# Patient Record
Sex: MALE | Race: OTHER | NOT HISPANIC OR LATINO | URBAN - METROPOLITAN AREA
[De-identification: names, ages, dates, MRNs, and addresses within clinical notes are randomized per-mention and may not be internally consistent; named-entity substitution may affect disease eponyms.]

---

## 2023-02-10 NOTE — ASU PATIENT PROFILE, ADULT - NS PREOP UNDERSTANDS INFO
yes No solid food/dairy/candy/gum after midnight, water before 10:00am tomorrow morning; Patient to bring photo ID/insurance card; no jewelries/contact lens/valuables; dress in comfortable clothes; no smoking/alcohol drinking/drug use today; escort to have photo ID; address and call back number was given./yes

## 2023-02-10 NOTE — ASU PATIENT PROFILE, ADULT - NSICDXPASTSURGICALHX_GEN_ALL_CORE_FT
PAST SURGICAL HISTORY:  H/O ingrown nail toe    History of deviated nasal septum     History of ear surgery right ear    Left varicocele

## 2023-02-14 ENCOUNTER — OUTPATIENT (OUTPATIENT)
Dept: OUTPATIENT SERVICES | Facility: HOSPITAL | Age: 43
LOS: 1 days | Discharge: HOME | End: 2023-02-14
Payer: COMMERCIAL

## 2023-02-14 VITALS
SYSTOLIC BLOOD PRESSURE: 123 MMHG | RESPIRATION RATE: 19 BRPM | HEART RATE: 81 BPM | OXYGEN SATURATION: 99 % | DIASTOLIC BLOOD PRESSURE: 79 MMHG | TEMPERATURE: 98 F

## 2023-02-14 VITALS
TEMPERATURE: 98 F | OXYGEN SATURATION: 96 % | WEIGHT: 155.87 LBS | DIASTOLIC BLOOD PRESSURE: 70 MMHG | RESPIRATION RATE: 16 BRPM | SYSTOLIC BLOOD PRESSURE: 116 MMHG | HEIGHT: 70 IN | HEART RATE: 69 BPM

## 2023-02-14 DIAGNOSIS — Z98.890 OTHER SPECIFIED POSTPROCEDURAL STATES: Chronic | ICD-10-CM

## 2023-02-14 DIAGNOSIS — Z87.09 PERSONAL HISTORY OF OTHER DISEASES OF THE RESPIRATORY SYSTEM: Chronic | ICD-10-CM

## 2023-02-14 DIAGNOSIS — I86.1 SCROTAL VARICES: Chronic | ICD-10-CM

## 2023-02-14 DIAGNOSIS — Z87.2 PERSONAL HISTORY OF DISEASES OF THE SKIN AND SUBCUTANEOUS TISSUE: Chronic | ICD-10-CM

## 2023-02-14 PROCEDURE — 38221 DX BONE MARROW BIOPSIES: CPT | Mod: AS

## 2023-02-14 RX ORDER — FENTANYL CITRATE 50 UG/ML
25 INJECTION INTRAVENOUS
Refills: 0 | Status: DISCONTINUED | OUTPATIENT
Start: 2023-02-14 | End: 2023-02-14

## 2023-02-14 RX ORDER — HYDROMORPHONE HYDROCHLORIDE 2 MG/ML
0.5 INJECTION INTRAMUSCULAR; INTRAVENOUS; SUBCUTANEOUS
Refills: 0 | Status: DISCONTINUED | OUTPATIENT
Start: 2023-02-14 | End: 2023-02-14

## 2023-02-14 RX ORDER — CHLORHEXIDINE GLUCONATE 213 G/1000ML
1 SOLUTION TOPICAL DAILY
Refills: 0 | Status: DISCONTINUED | OUTPATIENT
Start: 2023-02-14 | End: 2023-02-14

## 2023-02-14 RX ORDER — ACETAMINOPHEN 500 MG
1000 TABLET ORAL ONCE
Refills: 0 | Status: COMPLETED | OUTPATIENT
Start: 2023-02-14 | End: 2023-02-14

## 2023-02-14 RX ORDER — ONDANSETRON 8 MG/1
4 TABLET, FILM COATED ORAL ONCE
Refills: 0 | Status: DISCONTINUED | OUTPATIENT
Start: 2023-02-14 | End: 2023-02-14

## 2023-02-14 RX ORDER — VANCOMYCIN HCL 1 G
1000 VIAL (EA) INTRAVENOUS ONCE
Refills: 0 | Status: COMPLETED | OUTPATIENT
Start: 2023-02-14 | End: 2023-02-14

## 2023-02-14 RX ORDER — APREPITANT 80 MG/1
40 CAPSULE ORAL ONCE
Refills: 0 | Status: COMPLETED | OUTPATIENT
Start: 2023-02-14 | End: 2023-02-14

## 2023-02-14 RX ORDER — SODIUM CHLORIDE 9 MG/ML
1000 INJECTION, SOLUTION INTRAVENOUS
Refills: 0 | Status: DISCONTINUED | OUTPATIENT
Start: 2023-02-14 | End: 2023-02-14

## 2023-02-14 RX ORDER — OXYCODONE HYDROCHLORIDE 5 MG/1
5 TABLET ORAL ONCE
Refills: 0 | Status: DISCONTINUED | OUTPATIENT
Start: 2023-02-14 | End: 2023-02-14

## 2023-02-14 RX ADMIN — Medication 250 MILLIGRAM(S): at 14:44

## 2023-02-14 RX ADMIN — CHLORHEXIDINE GLUCONATE 1 APPLICATION(S): 213 SOLUTION TOPICAL at 14:30

## 2023-02-14 RX ADMIN — APREPITANT 40 MILLIGRAM(S): 80 CAPSULE ORAL at 14:44

## 2023-02-14 NOTE — ASU DISCHARGE PLAN (ADULT/PEDIATRIC) - CARE PROVIDER_API CALL
Andrea Romero)  Orthopaedic Surgery; Sports Medicine; Surgery of the Hand  [address] [place] [address]  Phone: [phone]  Fax: [phone]  Established Patient  Follow Up Time:

## 2023-02-14 NOTE — PRE-ANESTHESIA EVALUATION ADULT - NSANTHOSAYNRD_GEN_A_CORE
No. YOSEPH screening performed.  STOP BANG Legend: 0-2 = LOW Risk; 3-4 = INTERMEDIATE Risk; 5-8 = HIGH Risk

## 2023-02-14 NOTE — ASU DISCHARGE PLAN (ADULT/PEDIATRIC) - PROCEDURE
Right Knee Arthroscopy, Partial Medial Menisectomy, Synovectomy, Bone Marrow Aspirate Concentrate injection (BMAC)

## 2023-02-14 NOTE — ASU DISCHARGE PLAN (ADULT/PEDIATRIC) - ASU DC SPECIAL INSTRUCTIONSFT
Keep leg elevated and with ice.   Keep dressing dry.  Non-Weight Bearing Right Leg with Crutches.  Take medication as prescribed.  Follow up next week with Dr. Romero.

## 2023-02-14 NOTE — BRIEF OPERATIVE NOTE - NSICDXBRIEFPROCEDURE_GEN_ALL_CORE_FT
PROCEDURES:  Synovectomy, knee, major, arthroscopic 14-Feb-2023 18:49:24  Lexx [facility]den  Injection, aspirate, bone marrow, concentrated 14-Feb-2023 18:50:01  Lexx Bowden  Arthroscopic partial medial meniscectomy 14-Feb-2023 18:50:53  Lexx Bowden

## 2023-02-14 NOTE — ASU PREOP CHECKLIST - HEIGHT IN CM
177.8 Peng Advancement Flap Text: The defect edges were debeveled with a #15 scalpel blade.  Given the location of the defect, shape of the defect and the proximity to free margins a Peng advancement flap was deemed most appropriate.  Using a sterile surgical marker, an appropriate advancement flap was drawn incorporating the defect and placing the expected incisions within the relaxed skin tension lines where possible. The area thus outlined was incised deep to adipose tissue with a #15 scalpel blade.  The skin margins were undermined to an appropriate distance in all directions utilizing iris scissors.

## 2023-02-14 NOTE — ASU DISCHARGE PLAN (ADULT/PEDIATRIC) - DO NOT DRIVE IF TAKING PAIN MEDICATION
S/O: Pt admitted with abdominal/ diverticular abscess. VSS, A&Ox3. Pt denies pain or discomforts. Pt remains free from fall or injury during hospital stay. Pt tolerates diet without difficulty. Skin remains intact. Pt tolerates activity, remains mobile, moving safely about room and ayala under hospital staff supervision.        A: Goals met.      P: D/C plan of care and D/C pt home today with [facility] services.    NULL

## 2023-02-24 NOTE — PRE-ANESTHESIA EVALUATION ADULT - WEIGHT IN LBS
antibiotic therapy venous access arterial puncture to obtain ABG's/critical patient/monitoring purposes 155.8

## 2023-07-12 NOTE — ASU PREOP CHECKLIST - ORDERS/MEDICATION ADMINISTRATION RECORD ON CHART
[FreeTextEntry1] : 62 W who is here for initial consultation of migraine since 20 yrs of age. She was seen by Dr. Schmitt and she was offered emgality but she never tried it. \par location: back to top of head\par quality: constant vice like feeling\par severity 7-9/10\par freq: 17 times a month\par duration: 1.5 days without treatment\par associated with pulsating white light, nausea, vomiting, phonophobia, no tac's. \par She has tried nortripytline. She takes fioricet prn, rizatriptan, sumatriptan.\par \par Interval history. Since her last visit patient stopped the Topamax and was started on propranolol in December. Patient states that the propranolol didn't work neither and she has been using Fioricet symptoms 5 times a week and then she would stop however the following week she would have it restarted again. She was again informed that appears that is a major cause of her rebound headache and she should taper off. Patient has tried rizatriptan and sumatriptan in the past however they made her "loopy." \par \par Interval history: Patient has not started the frovatriptan.  She is here for her Botox injection.\par \par Interval history: Patient states that the insurance may not have approved frovatriptan.  I have reached out to my staff to look into this further and to see what alternatives there are.  Patient had her Botox injection about 10 weeks ago and has wearing off headache.  Patient is here for occipital nerve block.\par \par interval history: pt has not yet tried the naratriptan. \par \par interval history: Patient was supposed to come in for Botox injection today.  Patient states that she does not want to have the Botox injection as she was getting a bill from the insurance company of over $200 for her last Botox.  Patient will forward the invoice to my  who obtained the authorization for the Botox.  We decided to skip the Botox injection because of the financial confusion.  Patient's friend had relief with gabapentin for her migraine and she was interested in trying that.\par \par Interval history March 22, 2023: Patient states that on gabapentin 300 mg at night she felt depressed and she had stopped taking the medication.  Patient has been using butalbital and would like to return to Botox as it has helped in the past.  Patient states that about 2-1/2 months into it her headaches would return and we may try occipital nerve blocks during the Botox trial again.\par \par Interval history May 17, 2023 patient is here for Botox injection.  Patient had less hiatus with the Botox and this is her second Botox.  We will give the Botox 3 injections to see if that will help with her migraines.\par \par interval history July 12, 2023: Patient is doing well on Botox.  Her migraine returned only 1 week prior to her Botox injection. done

## (undated) DEVICE — SYR LUER LOK 50CC

## (undated) DEVICE — TUBING LINVATEC ARTHROSCOPY INFLOW

## (undated) DEVICE — DRAPE MAYO STAND 23"

## (undated) DEVICE — TOURNIQUET CUFF 34" DUAL PORT W PLC

## (undated) DEVICE — BUR LG HB SPHR HI SPD 4.5MM

## (undated) DEVICE — SLV COMPRESSION KNEE MED

## (undated) DEVICE — DRSG STERISTRIPS 0.5 X 4"

## (undated) DEVICE — WARMING BLANKET UPPER ADULT

## (undated) DEVICE — DRAPE PROBE COVER 5" X 96"

## (undated) DEVICE — SUT VICRYL 2-0 27" PS-2 UNDYED

## (undated) DEVICE — PACK ARTHROSCOPY ACL

## (undated) DEVICE — SHAVER BLADE LINVATEC FULL RADIUS RESECTOR 3.5MM

## (undated) DEVICE — SHAVER BLADE GREAT WHITE 3.7MM 30DEG

## (undated) DEVICE — Device

## (undated) DEVICE — DRSG STOCKINETTE IMPERVIOUS XL

## (undated) DEVICE — BUR SPHER STERLING 3.5MMX13CM

## (undated) DEVICE — SHAVER BLADE GREAT WHITE 4.2MM 15DEG

## (undated) DEVICE — MARKING PEN W RULER

## (undated) DEVICE — SOL IRR BAG NS 0.9% 3000ML

## (undated) DEVICE — TOURNIQUET ESMARK 6"

## (undated) DEVICE — CATH IV SAFE INSYTE 18G X 1.16" (GREEN)

## (undated) DEVICE — CATH IV SAFE BC 22G X 1" (BLUE)

## (undated) DEVICE — SHAVER BLADE LINVATEC SLOTTED WHISKER LG 4.2MM

## (undated) DEVICE — SUT MONOCRYL 3-0 27" PS-2 UNDYED

## (undated) DEVICE — GOWN XL EXTRA LONG

## (undated) DEVICE — POSITIONER FOAM EGG CRATE ULNAR 2PCS (PINK)